# Patient Record
Sex: FEMALE | Race: WHITE
[De-identification: names, ages, dates, MRNs, and addresses within clinical notes are randomized per-mention and may not be internally consistent; named-entity substitution may affect disease eponyms.]

---

## 2019-03-28 ENCOUNTER — HOSPITAL ENCOUNTER (EMERGENCY)
Dept: HOSPITAL 97 - ER | Age: 29
Discharge: HOME | End: 2019-03-28
Payer: SELF-PAY

## 2019-03-28 DIAGNOSIS — N20.1: Primary | ICD-10-CM

## 2019-03-28 LAB
ALBUMIN SERPL BCP-MCNC: 3.5 G/DL (ref 3.4–5)
ALP SERPL-CCNC: 90 U/L (ref 45–117)
ALT SERPL W P-5'-P-CCNC: 33 U/L (ref 12–78)
AST SERPL W P-5'-P-CCNC: 20 U/L (ref 15–37)
BUN BLD-MCNC: 19 MG/DL (ref 7–18)
GLUCOSE SERPLBLD-MCNC: 160 MG/DL (ref 74–106)
HCT VFR BLD CALC: 44 % (ref 36–45)
LIPASE SERPL-CCNC: 76 U/L (ref 73–393)
LYMPHOCYTES # SPEC AUTO: 1.4 K/UL (ref 0.7–4.9)
PMV BLD: 9 FL (ref 7.6–11.3)
POTASSIUM SERPL-SCNC: 3.9 MMOL/L (ref 3.5–5.1)
RBC # BLD: 5.39 M/UL (ref 3.86–4.86)
UA COMPLETE W REFLEX CULTURE PNL UR: (no result)
UA DIPSTICK W REFLEX MICRO PNL UR: (no result)

## 2019-03-28 PROCEDURE — 99284 EMERGENCY DEPT VISIT MOD MDM: CPT

## 2019-03-28 PROCEDURE — 80048 BASIC METABOLIC PNL TOTAL CA: CPT

## 2019-03-28 PROCEDURE — 81015 MICROSCOPIC EXAM OF URINE: CPT

## 2019-03-28 PROCEDURE — 81025 URINE PREGNANCY TEST: CPT

## 2019-03-28 PROCEDURE — 36415 COLL VENOUS BLD VENIPUNCTURE: CPT

## 2019-03-28 PROCEDURE — 74176 CT ABD & PELVIS W/O CONTRAST: CPT

## 2019-03-28 PROCEDURE — 83690 ASSAY OF LIPASE: CPT

## 2019-03-28 PROCEDURE — 81003 URINALYSIS AUTO W/O SCOPE: CPT

## 2019-03-28 PROCEDURE — 96374 THER/PROPH/DIAG INJ IV PUSH: CPT

## 2019-03-28 PROCEDURE — 76377 3D RENDER W/INTRP POSTPROCES: CPT

## 2019-03-28 PROCEDURE — 87086 URINE CULTURE/COLONY COUNT: CPT

## 2019-03-28 PROCEDURE — 80076 HEPATIC FUNCTION PANEL: CPT

## 2019-03-28 PROCEDURE — 87088 URINE BACTERIA CULTURE: CPT

## 2019-03-28 PROCEDURE — 85025 COMPLETE CBC W/AUTO DIFF WBC: CPT

## 2019-03-28 NOTE — RAD REPORT
EXAM DESCRIPTION:  CT - Stone Protocol - 3/28/2019 4:59 am

 

COMPARISON:  None

 

CLINICAL HISTORY:  Flank pain, left abdominal pain

 

TECHNIQUE:  Multiple helical axial images were obtained through the abdomen and pelvis without intrav
enous contrast. Sagittal and coronal reformatted images are reviewed as well.

All CT scans at this facility use dose modulation, iterative reconstruction, and/or weight-based dosi
ng when appropriate to reduce radiation dose to as low as reasonably achievable.

 

FINDINGS:  Lung bases: Unremarkable.

Liver: Homogenous attenuation is demonstrated.

Gallbladder/biliary: Gallbladder appears unremarkable. No calcified gallstones. No evidence of biliar
y ductal dilatation.

Pancreas: Unremarkable.

Spleen: Unremarkable.

Adrenals: Unremarkable.

Kidneys and ureters: There is a 4 mm stone in the proximal left ureter with mild left hydronephrosis.
 Subcentimeter hypodensity in the right kidney is present too small to characterize but likely repres
enting a cyst.

Bladder: Unremarkable.

Pelvic organs: Unremarkable.

Bowel: No evidence of bowel obstruction. No bowel wall thickening. Appendix appears unremarkable.

Peritoneum: No free air. No significant free fluid.

Lymph nodes: Unremarkable.

Vasculature: Unremarkable.

Soft tissues: Unremarkable.

Bones: Unremarkable.

 

IMPRESSION:  Left proximal ureteral 4 mm stone with mild left hydronephrosis.

 

Electronically signed by:   Etienne Barrera MD   3/28/2019 3:45 AM CDT Workstation: [id number]

 

 

Due to temporary technical issues with the PACS/Fluency reporting system, reports are being signed by
 the in house radiologist as a courtesy to ensure prompt reporting. The interpreting radiologist is f
ully responsible for the content of the report.

## 2019-03-28 NOTE — ER
Nurse's Notes                                                                                     

 [facility]                                                                 

Name: Leanna Akhtar                                                                                

Age: 28 yrs                                                                                       

Sex: Female                                                                                       

: 1990                                                                                   

MRN: [id number]                                                                                   

Arrival Date: 2019                                                                          

Time: 01:46                                                                                       

Account#: [id number]                                                                            

Bed 13                                                                                            

Private MD:                                                                                       

Diagnosis: Calculus of ureter                                                                     

                                                                                                  

Presentation:                                                                                     

                                                                                             

01:59 Presenting complaint: Patient states: Complaint of vomiting, dizziness, blood when      lp1 

      wiping after voiding since 2315 tonight; Denies any fever. Transition of care: patient      

      was not received from another setting of care. Onset of symptoms was 2019.        

      Risk Assessment: Do you want to hurt yourself or someone else? Patient reports no           

      desire to harm self or others. Initial Sepsis Screen: Does the patient meet any 2           

      criteria? No. Patient's initial sepsis screen is negative. Does the patient have a          

      suspected source of infection? No. Patient's initial sepsis screen is negative. Care        

      prior to arrival: None.                                                                     

01:59 Method Of Arrival: Ambulatory                                                           lp1 

01:59 Acuity: GERMÁN 3                                                                           lp1 

                                                                                                  

OB/GYN:                                                                                           

02:01 LMP N/A - Irregular menses                                                              lp1 

                                                                                                  

Historical:                                                                                       

- Allergies:                                                                                      

02:02 No Known Allergies;                                                                     lp1 

- Home Meds:                                                                                      

02:02 Nexplanon implant [Active];                                                             lp1 

- PMHx:                                                                                           

02:02 Kidney stones;                                                                          lp1 

- PSHx:                                                                                           

02:02 None;                                                                                   lp1 

                                                                                                  

- Immunization history:: Adult Immunizations up to date.                                          

- Social history:: Smoking status: Patient/guardian denies using tobacco.                         

- Ebola Screening: : No symptoms or risks identified at this time.                                

                                                                                                  

                                                                                                  

Screenin:04 Abuse screen: Denies threats or abuse. Denies injuries from another. Nutritional        lp1 

      screening: No deficits noted. Tuberculosis screening: No symptoms or risk factors           

      identified. Fall Risk None identified.                                                      

                                                                                                  

Assessment:                                                                                       

02:02 General: Appears uncomfortable, Behavior is appropriate for age. Pain: Complains of     lp1 

      pain in left upper quadrant and left lower quadrant Pain currently is 8 out of 10 on a      

      pain scale. Quality of pain is described as aching, sharp. Neuro: Level of                  

      Consciousness is awake, alert, obeys commands. Cardiovascular: Patient's skin is warm       

      and dry. Respiratory: Respiratory effort is even, unlabored. GI: Abdomen is obese,          

      Abdomen is tender to palpation in left upper quadrant and left lower quadrant. :          

      Reports vaginal bleeding that is "pink when wiping". EENT: No signs and/or symptoms         

      were reported regarding the EENT system. Derm: Skin is intact, Skin is dry, Skin is         

      pale. Musculoskeletal: No deficits noted.                                                   

04:34 Reassessment: Patient is alert, oriented x 3, equal unlabored respirations, skin        lp1 

      warm/dry/pink. Patient states feeling better. Patient states symptoms have improved.        

                                                                                                  

Vital Signs:                                                                                      

02:01  / 84; Pulse 82; Resp 18; Temp 97.6(O); Pulse Ox 99% on R/A; Weight 122.47 kg;    lp1 

      Height 5 ft. 8 in. (172.72 cm); Pain 8/10;                                                  

03:34  / 67; Pulse 84; Resp 18; Pulse Ox 99% on R/A;                                    lp1 

02:01 Body Mass Index 41.05 (122.47 kg, 172.72 cm)                                            lp1 

                                                                                                  

ED Course:                                                                                        

01:46 Patient arrived in ED.                                                                  es  

01:58 Yuli Childers, RN is Primary Nurse.                                                       lp1 

02:00 Triage completed.                                                                       lp1 

02:00 Arm band placed on.                                                                     lp1 

02:04 Patient has correct armband on for positive identification. Pulse ox on. NIBP on.       lp1 

02:36 Jn Valdovinos MD is Attending Physician.                                              gs  

02:50 Inserted saline lock: 20 gauge in right antecubital area, using aseptic technique.      lp1 

      Blood collected.                                                                            

03:17 Patient moved to CT via wheelchair.                                                     eh  

03:19 CT completed. Patient tolerated procedure well.                                         eh  

03:24 Patient moved back from CT.                                                             eh  

03:32 CT Stone Protocol In Process Unspecified.                                               EDMS

04:18 Susan Adam MD is Referral Physician.                                              gs  

04:33 No provider procedures requiring assistance completed. IV discontinued, No              lp1 

      redness/swelling at site. Pressure dressing applied.                                        

                                                                                                  

Administered Medications:                                                                         

03:33 Drug: TORadol 30 mg Route: IVP; Site: right antecubital;                                lp1 

04:33 Follow up: Response: Marked relief of symptoms; Pain is decreased                       lp1 

                                                                                                  

                                                                                                  

Outcome:                                                                                          

04:18 Discharge ordered by MD.                                                                gs  

04:34 Discharged to home ambulatory, with significant other.                                  lp1 

04:34 Condition: good                                                                             

04:34 Discharge instructions given to patient, Instructed on discharge instructions, follow       

      up and referral plans. medication usage, Demonstrated understanding of instructions,        

      follow-up care, medications, Prescriptions given X 2.                                       

04:34 Patient left the ED.                                                                    lp1 

                                                                                                  

Signatures:                                                                                       

Dispatcher [employer]                           Nichelle Bertrand Ervin eh Pena, Laura RN                         RN   lp1                                                  

Jn Valdovinos MD MD                                                      

                                                                                                  

**************************************************************************************************